# Patient Record
Sex: MALE
[De-identification: names, ages, dates, MRNs, and addresses within clinical notes are randomized per-mention and may not be internally consistent; named-entity substitution may affect disease eponyms.]

---

## 2019-02-27 PROBLEM — Z00.00 ENCOUNTER FOR PREVENTIVE HEALTH EXAMINATION: Status: ACTIVE | Noted: 2019-02-27

## 2019-03-29 ENCOUNTER — APPOINTMENT (OUTPATIENT)
Dept: OTOLARYNGOLOGY | Facility: CLINIC | Age: 64
End: 2019-03-29
Payer: MEDICAID

## 2019-03-29 PROCEDURE — V5299A: CUSTOM | Mod: NC

## 2019-04-15 ENCOUNTER — APPOINTMENT (OUTPATIENT)
Dept: OTOLARYNGOLOGY | Facility: CLINIC | Age: 64
End: 2019-04-15
Payer: MEDICAID

## 2019-04-15 VITALS
DIASTOLIC BLOOD PRESSURE: 86 MMHG | WEIGHT: 138 LBS | BODY MASS INDEX: 23.56 KG/M2 | HEIGHT: 64 IN | SYSTOLIC BLOOD PRESSURE: 132 MMHG | HEART RATE: 108 BPM

## 2019-04-15 DIAGNOSIS — Z87.39 PERSONAL HISTORY OF OTHER DISEASES OF THE MUSCULOSKELETAL SYSTEM AND CONNECTIVE TISSUE: ICD-10-CM

## 2019-04-15 DIAGNOSIS — Z86.39 PERSONAL HISTORY OF OTHER ENDOCRINE, NUTRITIONAL AND METABOLIC DISEASE: ICD-10-CM

## 2019-04-15 DIAGNOSIS — Z86.79 PERSONAL HISTORY OF OTHER DISEASES OF THE CIRCULATORY SYSTEM: ICD-10-CM

## 2019-04-15 DIAGNOSIS — Z83.3 FAMILY HISTORY OF DIABETES MELLITUS: ICD-10-CM

## 2019-04-15 DIAGNOSIS — Z82.49 FAMILY HISTORY OF ISCHEMIC HEART DISEASE AND OTHER DISEASES OF THE CIRCULATORY SYSTEM: ICD-10-CM

## 2019-04-15 DIAGNOSIS — Z87.891 PERSONAL HISTORY OF NICOTINE DEPENDENCE: ICD-10-CM

## 2019-04-15 DIAGNOSIS — Z78.9 OTHER SPECIFIED HEALTH STATUS: ICD-10-CM

## 2019-04-15 DIAGNOSIS — Z87.448 PERSONAL HISTORY OF OTHER DISEASES OF URINARY SYSTEM: ICD-10-CM

## 2019-04-15 PROCEDURE — 99213 OFFICE O/P EST LOW 20 MIN: CPT | Mod: 25

## 2019-04-15 PROCEDURE — 92504 EAR MICROSCOPY EXAMINATION: CPT

## 2019-04-15 NOTE — HISTORY OF PRESENT ILLNESS
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 This improves with the use of otic drops. However there is consistent use of a hearing aid in the right ear which aggravates the otorrhea. No ear pain reported.

## 2019-04-15 NOTE — PHYSICAL EXAM
[Normal] : mucosa is normal [Midline] : trachea located in midline position [FreeTextEntry1] : Procedure: Microscopic Ear Exam\par \par Left ear:  \par Ear canal intact without inflammation or lesion.  \par Tympanic membrane intact without inflammation.\par \par \par Right ear:  \par Mild purulence in the ear canal. A culture was taken. Ear canal debrided with suction. The tympanic membrane appears intact. Partially replaced by granulation. No visible landmarks.

## 2019-04-15 NOTE — ASSESSMENT
[FreeTextEntry1] : Active inflammation in the right ear associated with continued hearing aid use. A culture was taken today. I have continue him on otic drops have recommended a hearing aid holiday. Followup advised.

## 2019-04-19 ENCOUNTER — APPOINTMENT (OUTPATIENT)
Dept: OTOLARYNGOLOGY | Facility: CLINIC | Age: 64
End: 2019-04-19
Payer: SELF-PAY

## 2019-04-19 PROCEDURE — 92593: CPT

## 2019-04-19 PROCEDURE — V5299A: CUSTOM | Mod: NC

## 2019-04-22 LAB — BACTERIA SPEC CULT: ABNORMAL

## 2019-05-15 LAB — FUNGUS SPEC CULT ORG #8: ABNORMAL

## 2019-05-24 ENCOUNTER — APPOINTMENT (OUTPATIENT)
Dept: OTOLARYNGOLOGY | Facility: CLINIC | Age: 64
End: 2019-05-24

## 2019-07-08 ENCOUNTER — APPOINTMENT (OUTPATIENT)
Dept: OTOLARYNGOLOGY | Facility: CLINIC | Age: 64
End: 2019-07-08
Payer: MEDICARE

## 2019-07-08 VITALS
HEIGHT: 64 IN | WEIGHT: 138 LBS | HEART RATE: 100 BPM | BODY MASS INDEX: 23.56 KG/M2 | DIASTOLIC BLOOD PRESSURE: 92 MMHG | SYSTOLIC BLOOD PRESSURE: 141 MMHG

## 2019-07-08 PROCEDURE — 99213 OFFICE O/P EST LOW 20 MIN: CPT | Mod: 25

## 2019-07-08 PROCEDURE — 69210 REMOVE IMPACTED EAR WAX UNI: CPT

## 2019-07-08 NOTE — HISTORY OF PRESENT ILLNESS
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 This improves with the use of otic drops. However there is consistent use of a hearing aid in the right ear which aggravates the otorrhea. No ear pain reported. [FreeTextEntry1] : 07/08/2019 Patient report otorrhea at night, patient stated hasn't used the ofloxacin for about a month. Patient reports intermittent pain in the right ear canal. No vertigo and tinnitus reported.

## 2019-07-08 NOTE — PHYSICAL EXAM
[Normal] : mucosa is normal [Midline] : trachea located in midline position [FreeTextEntry1] : Procedure: Microscopic Ear Exam\par \par Left ear:  \par Ear canal intact without inflammation or lesion.  \par Tympanic membrane intact without inflammation.\par \par \par Right ear:  \par Obstructing cerumen debrided with a curet. Tympanic membrane is replaced by granulation, chronic, not acutely inflamed. Perforation cannot be ruled out.

## 2019-07-08 NOTE — ASSESSMENT
[FreeTextEntry1] : Recently lost right-sided hearing aid. I have recommended that he follow up with audiology for replacement.\par \par Continued use of otic drops as needed for intermittent otorrhea. Clinical monitoring advised.

## 2019-07-16 ENCOUNTER — APPOINTMENT (OUTPATIENT)
Dept: OTOLARYNGOLOGY | Facility: CLINIC | Age: 64
End: 2019-07-16

## 2019-10-07 ENCOUNTER — APPOINTMENT (OUTPATIENT)
Dept: OTOLARYNGOLOGY | Facility: CLINIC | Age: 64
End: 2019-10-07
Payer: MEDICARE

## 2019-10-07 VITALS
HEIGHT: 64 IN | SYSTOLIC BLOOD PRESSURE: 124 MMHG | HEART RATE: 102 BPM | DIASTOLIC BLOOD PRESSURE: 82 MMHG | BODY MASS INDEX: 23.56 KG/M2 | WEIGHT: 138 LBS

## 2019-10-07 PROCEDURE — 99213 OFFICE O/P EST LOW 20 MIN: CPT | Mod: 25

## 2019-10-07 PROCEDURE — 92504 EAR MICROSCOPY EXAMINATION: CPT

## 2019-10-07 NOTE — PHYSICAL EXAM
[FreeTextEntry1] : Procedure: Microscopic Ear Exam\par \par Left ear:  \par Ear canal intact without inflammation or lesion.  \par Tympanic membrane intact without inflammation.\par \par \par Right ear:  \par Ear canal intact without inflammation or lesion.\par Tympanic membrane with postsurgical changes mucosalized. No acute inflammation. [Midline] : trachea located in midline position [Normal] : mucosa is normal

## 2019-10-07 NOTE — HISTORY OF PRESENT ILLNESS
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 This improves with the use of otic drops. However there is consistent use of a hearing aid in the right ear which aggravates the otorrhea. No ear pain reported. [FreeTextEntry1] : 10/07/2019 Patient is being seen as a routine follow up for chronic infectious ear disease. Patient reports that otorrhea has subsided. Patient denies of any changes in hearing. No Pain and no vertigo reported at this visit.

## 2019-10-07 NOTE — ASSESSMENT
[FreeTextEntry1] : Apparently stable ear condition with no active inflammation. Regular use of hearing aids may precipitate recurrent external ear infections. Continued clinical monitoring recommended. Judicious use of a cups.

## 2019-10-08 ENCOUNTER — APPOINTMENT (OUTPATIENT)
Dept: OTOLARYNGOLOGY | Facility: CLINIC | Age: 64
End: 2019-10-08

## 2019-10-16 ENCOUNTER — APPOINTMENT (OUTPATIENT)
Dept: OTOLARYNGOLOGY | Facility: CLINIC | Age: 64
End: 2019-10-16
Payer: SELF-PAY

## 2019-10-16 PROCEDURE — 92593: CPT | Mod: NC

## 2019-10-23 ENCOUNTER — APPOINTMENT (OUTPATIENT)
Dept: OTOLARYNGOLOGY | Facility: CLINIC | Age: 64
End: 2019-10-23
Payer: SELF-PAY

## 2019-10-23 PROCEDURE — 92593: CPT | Mod: NC

## 2019-11-01 ENCOUNTER — APPOINTMENT (OUTPATIENT)
Dept: OTOLARYNGOLOGY | Facility: CLINIC | Age: 64
End: 2019-11-01
Payer: SELF-PAY

## 2019-11-01 DIAGNOSIS — Z46.1 ENCOUNTER FOR FITTING AND ADJUSTMENT OF HEARING AID: ICD-10-CM

## 2019-11-01 PROCEDURE — 92593: CPT | Mod: NC

## 2020-01-06 ENCOUNTER — APPOINTMENT (OUTPATIENT)
Dept: OTOLARYNGOLOGY | Facility: CLINIC | Age: 65
End: 2020-01-06

## 2020-01-13 ENCOUNTER — APPOINTMENT (OUTPATIENT)
Dept: OTOLARYNGOLOGY | Facility: CLINIC | Age: 65
End: 2020-01-13

## 2020-12-28 ENCOUNTER — APPOINTMENT (OUTPATIENT)
Dept: OTOLARYNGOLOGY | Facility: CLINIC | Age: 65
End: 2020-12-28
Payer: MEDICARE

## 2020-12-28 PROCEDURE — 92557 COMPREHENSIVE HEARING TEST: CPT

## 2020-12-28 PROCEDURE — 92567 TYMPANOMETRY: CPT

## 2020-12-28 PROCEDURE — 99214 OFFICE O/P EST MOD 30 MIN: CPT | Mod: 25

## 2020-12-28 PROCEDURE — 69210 REMOVE IMPACTED EAR WAX UNI: CPT

## 2020-12-28 PROCEDURE — 99072 ADDL SUPL MATRL&STAF TM PHE: CPT

## 2020-12-28 RX ORDER — AZELASTINE HYDROCHLORIDE 0.5 MG/ML
0.05 SOLUTION/ DROPS OPHTHALMIC
Qty: 6 | Refills: 0 | Status: DISCONTINUED | COMMUNITY
Start: 2018-09-18 | End: 2020-12-28

## 2020-12-28 RX ORDER — CHLORZOXAZONE 250 MG/1
250 TABLET ORAL
Qty: 60 | Refills: 0 | Status: DISCONTINUED | COMMUNITY
Start: 2018-10-16 | End: 2020-12-28

## 2020-12-28 RX ORDER — LISINOPRIL AND HYDROCHLOROTHIAZIDE TABLETS 10; 12.5 MG/1; MG/1
10-12.5 TABLET ORAL
Qty: 30 | Refills: 0 | Status: DISCONTINUED | COMMUNITY
Start: 2018-05-09 | End: 2020-12-28

## 2020-12-28 RX ORDER — LORATADINE 10 MG/1
10 TABLET ORAL
Qty: 30 | Refills: 0 | Status: DISCONTINUED | COMMUNITY
Start: 2018-10-10 | End: 2020-12-28

## 2020-12-28 RX ORDER — FLUTICASONE PROPIONATE 50 UG/1
50 SPRAY, METERED NASAL
Qty: 16 | Refills: 0 | Status: DISCONTINUED | COMMUNITY
Start: 2018-04-25 | End: 2020-12-28

## 2020-12-28 RX ORDER — LIDOCAINE 5% 700 MG/1
5 PATCH TOPICAL
Qty: 60 | Refills: 0 | Status: DISCONTINUED | COMMUNITY
Start: 2018-10-16 | End: 2020-12-28

## 2020-12-28 RX ORDER — BUPROPION HYDROCHLORIDE 75 MG/1
75 TABLET, FILM COATED ORAL
Qty: 120 | Refills: 0 | Status: DISCONTINUED | COMMUNITY
Start: 2018-08-08 | End: 2020-12-28

## 2020-12-28 RX ORDER — OFLOXACIN OTIC 3 MG/ML
0.3 SOLUTION AURICULAR (OTIC)
Qty: 1 | Refills: 2 | Status: DISCONTINUED | COMMUNITY
Start: 2018-10-15 | End: 2020-12-28

## 2020-12-28 RX ORDER — DULAGLUTIDE 1.5 MG/.5ML
1.5 INJECTION, SOLUTION SUBCUTANEOUS
Qty: 4 | Refills: 0 | Status: DISCONTINUED | COMMUNITY
Start: 2018-06-06 | End: 2020-12-28

## 2020-12-28 RX ORDER — OFLOXACIN OTIC 3 MG/ML
0.3 SOLUTION AURICULAR (OTIC)
Qty: 1 | Refills: 1 | Status: ACTIVE | COMMUNITY
Start: 2020-12-28 | End: 1900-01-01

## 2020-12-28 RX ORDER — GEMFIBROZIL 600 MG/1
600 TABLET, FILM COATED ORAL
Qty: 60 | Refills: 0 | Status: DISCONTINUED | COMMUNITY
Start: 2018-05-09 | End: 2020-12-28

## 2020-12-28 RX ORDER — METFORMIN HYDROCHLORIDE 500 MG/1
500 TABLET, COATED ORAL
Qty: 60 | Refills: 0 | Status: DISCONTINUED | COMMUNITY
Start: 2018-10-10 | End: 2020-12-28

## 2020-12-28 NOTE — HISTORY OF PRESENT ILLNESS
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 Patient reports intermittent right otorrhea. Patient denies of any changes in hearing. No Pain and no vertigo reported at this visit.  Not using otic drops.

## 2020-12-28 NOTE — DATA REVIEWED
[de-identified] : Complete audiometry was ordered and completed today. This was separately reported by the audiologist. The results were reviewed in detail with the patient.\par \par

## 2020-12-28 NOTE — PHYSICAL EXAM
[Normal] : temporomandibular joint is normal [FreeTextEntry1] : Procedure: Microscopic Ear Exam with cerumen debridement\par \par Left ear:  \par obstructing cerumen debrided with alligator forceps. Ear canal intact without inflammation or lesion.  \par Tympanic membrane intact without inflammation.\par \par \par Right ear:  \par Ear canal intact without inflammation or lesion.\par Tympanic membrane with postsurgical changes mucosalized. No acute inflammation.

## 2020-12-28 NOTE — CONSULT LETTER
[Please see my note below.] : Please see my note below. [FreeTextEntry2] : Dear ABILIO SORENSEN  [FreeTextEntry1] : Thank you for allowing me to participate in the care of SUBHASH TUTTLE .\par Please see the attached visit note.\par \par \par \par Lorne Miller\par Otology\par Medical Director of [facility]\par Department of Otolaryngology\par [facility]

## 2020-12-28 NOTE — REASON FOR VISIT
[Subsequent Evaluation] : a subsequent evaluation for [Ear Drainage] : ear drainage [Hearing Loss] : hearing loss

## 2020-12-28 NOTE — ASSESSMENT
[FreeTextEntry1] : No current evidence of inflammation in the right ear with a history of intermittent otorrhea. We discussed the occasional use of otic drops for otorrhea. Clinical monitoring advised. Continued use of bilateral application.

## 2021-06-28 ENCOUNTER — APPOINTMENT (OUTPATIENT)
Dept: OTOLARYNGOLOGY | Facility: CLINIC | Age: 66
End: 2021-06-28
Payer: MEDICARE

## 2021-06-28 VITALS — HEIGHT: 64 IN | WEIGHT: 138 LBS | BODY MASS INDEX: 23.56 KG/M2 | TEMPERATURE: 97.3 F

## 2021-06-28 PROCEDURE — 99213 OFFICE O/P EST LOW 20 MIN: CPT | Mod: 25

## 2021-06-28 PROCEDURE — 31231 NASAL ENDOSCOPY DX: CPT

## 2021-06-28 PROCEDURE — 69210 REMOVE IMPACTED EAR WAX UNI: CPT

## 2021-06-28 RX ORDER — MOMETASONE 50 UG/1
50 SPRAY, METERED NASAL DAILY
Qty: 1 | Refills: 2 | Status: ACTIVE | COMMUNITY
Start: 2021-06-28 | End: 1900-01-01

## 2021-06-28 NOTE — CONSULT LETTER
[Please see my note below.] : Please see my note below. [FreeTextEntry2] : Dear ABILIO SORENSEN  [FreeTextEntry1] : Thank you for allowing me to participate in the care of SUBHASH TUTTLE .\par Please see the attached visit note.\par \par \par \par Lorne Miller\par Otology\par Medical Director of [facility]\par Department of Otolaryngology\par [facility]

## 2021-06-28 NOTE — PROCEDURE
[FreeTextEntry6] : PROCEDURE:  NASAL ENDOSCOPY  \par \par Surgeon: Dr. Miller\par Indication: Chronic Rhinitis\par Anesthetic: Topical lidocaine and Afrin\par Procedure: The patient was placed in a sitting position.  Following application of the topical anesthetic and decongestant, exam was performed with a flexible endoscope.  The following anatomic sites were directly examined bilaterally in a sequential fashion:\par The scope was introduced in the nasal passage between the middle and inferior turbinates to exam the inferior portion of the middle meatus and the fontanelle, as well as the maxillary ostia. Next, the scope was passed medially and posteriorly to the middle turbinates to examine the sphenoethmoid recess and the superior turbinate region.\par \par Nasopharynx: no masses, choanae patent, no adenoid tissue\par \par The following findings were noted:\par \par bilateral mild to moderate turbinate hypertrophy with airway obstruction. No purulence or polypoid disease.

## 2021-06-28 NOTE — HISTORY OF PRESENT ILLNESS
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  Hearing well with hearing aids.  No pain.  Also reports nasal obstruction with facial pressure. Past history of allergy.

## 2021-06-28 NOTE — PHYSICAL EXAM
[FreeTextEntry1] : Microscopic ear exam with cerumen debridement:\par \par Right ear: Obstructing cerumen was debrided from the ear canal using suction, and curet.  The ear canal was within normal limits.  The tympanic membrane was intact and noninflamed. Postsurgical changes. Intact without inflammation\par \par Left ear: The ear canal was patent and nonobstructed.  The tympanic membrane was intact and noninflamed. [Nasal Endoscopy Performed] : nasal endoscopy was performed, see procedure section for findings [Normal] : external appearance is normal [de-identified] : enlarged

## 2021-06-28 NOTE — ASSESSMENT
[FreeTextEntry1] : Stable right-sided ear condition, without evidence of inflammation at this time.\par \par Chronic rhinitis with nasal symptoms. Consistent use of sterile nasal spray recommended.\par \par Continued clinical monitoring with bilateral amplification. Advised.

## 2021-12-22 ENCOUNTER — APPOINTMENT (OUTPATIENT)
Dept: OTOLARYNGOLOGY | Facility: CLINIC | Age: 66
End: 2021-12-22
Payer: MEDICARE

## 2021-12-22 VITALS — TEMPERATURE: 98.2 F | BODY MASS INDEX: 22.2 KG/M2 | WEIGHT: 130 LBS | HEIGHT: 64 IN

## 2021-12-22 PROCEDURE — 92550 TYMPANOMETRY & REFLEX THRESH: CPT

## 2021-12-22 PROCEDURE — 92557 COMPREHENSIVE HEARING TEST: CPT

## 2021-12-22 PROCEDURE — 99213 OFFICE O/P EST LOW 20 MIN: CPT | Mod: 25

## 2021-12-22 RX ORDER — OFLOXACIN OTIC 3 MG/ML
0.3 SOLUTION AURICULAR (OTIC)
Qty: 1 | Refills: 3 | Status: ACTIVE | COMMUNITY
Start: 2019-04-15 | End: 1900-01-01

## 2021-12-22 NOTE — DATA REVIEWED
[de-identified] : In order to investigate current symptoms, Complete audiometry was ordered and completed today. I have interpreted these results and reviewed them in detail with the patient.\par \par

## 2021-12-22 NOTE — HISTORY OF PRESENT ILLNESS
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  Also bothered by watery positional rhinorrhea. No systemic symptoms

## 2021-12-22 NOTE — ASSESSMENT
[FreeTextEntry1] : No active inflammation today; otorrhea appears to be intermittent.\par Ear hygiene, reviewed in detail.\par \par Continue with hearing aids bilaterally.  Otic drops for otorrhea as needed.  Nasal spray for complaint of rhinorrhea.  This was discussed with the patient in detail\par \par Follow up plans discussed.

## 2022-03-15 ENCOUNTER — RX RENEWAL (OUTPATIENT)
Age: 67
End: 2022-03-15

## 2022-04-20 ENCOUNTER — APPOINTMENT (OUTPATIENT)
Dept: OTOLARYNGOLOGY | Facility: CLINIC | Age: 67
End: 2022-04-20
Payer: MEDICARE

## 2022-04-20 VITALS — TEMPERATURE: 97 F | WEIGHT: 130 LBS | BODY MASS INDEX: 22.2 KG/M2 | HEIGHT: 64 IN

## 2022-04-20 DIAGNOSIS — H66.3X1 OTHER CHRONIC SUPPURATIVE OTITIS MEDIA, RIGHT EAR: ICD-10-CM

## 2022-04-20 PROCEDURE — 92557 COMPREHENSIVE HEARING TEST: CPT

## 2022-04-20 PROCEDURE — 92567 TYMPANOMETRY: CPT

## 2022-04-20 PROCEDURE — 31231 NASAL ENDOSCOPY DX: CPT

## 2022-04-20 PROCEDURE — 99213 OFFICE O/P EST LOW 20 MIN: CPT | Mod: 25

## 2022-04-25 NOTE — HISTORY OF PRESENT ILLNESS
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  No otorrhea.  Hearing may be worse.  Using bilateral amplification\par Complains of nasal congestion, rhinorrhea and crusting in the nose.

## 2022-04-25 NOTE — ASSESSMENT
[FreeTextEntry1] : Hearing loss affecting the right ear greater than the left associated with chronic infectious ear disease.  This is mostly stable at this time.  I have reviewed ear hygiene and have recommended follow-up with his audiologist for hearing aid optimization.\par \par Nasal hygiene reviewed.  Continued use of saline nasal spray and Flonase

## 2022-04-25 NOTE — PROCEDURE
[FreeTextEntry6] : PROCEDURE:  NASAL ENDOSCOPY  \par \par Surgeon: Dr. Miller\par Indication: Chronic Rhinitis\par Anesthetic: Topical lidocaine and Afrin\par Procedure: The patient was placed in a sitting position.  Following application of the topical anesthetic and decongestant, exam was performed with a flexible endoscope.  The following anatomic sites were directly examined bilaterally in a sequential fashion:\par The scope was introduced in the nasal passage between the middle and inferior turbinates to exam the inferior portion of the middle meatus and the fontanelle, as well as the maxillary ostia. Next, the scope was passed medially and posteriorly to the middle turbinates to examine the sphenoethmoid recess and the superior turbinate region.\par \par Nasopharynx: no masses, choanae patent, no adenoid tissue\par \par The following findings were noted:\par Patent nasal airway bilaterally.  No purulence.  A small polypoid lesion is noted in the left ostiomeatal complex region.  This is nonobstructing.

## 2022-04-25 NOTE — PHYSICAL EXAM
[Nasal Endoscopy Performed] : nasal endoscopy was performed, see procedure section for findings [Normal] : no abnormal secretions [FreeTextEntry1] : Microscopic ear exam with cerumen debridement:\par \par Right ear: Obstructing cerumen was debrided from the ear canal using suction, and curet.  The ear canal was otherwise within normal limits.  Subtotal tympanic membrane perforation.  Retracted margins.  No active inflammation.\par \par \par Left ear: Obstructing cerumen was debrided from the ear canal using suction, and curets.  The ear canal was otherwise within normal limits.  The tympanic membrane was intact and noninflamed.

## 2022-10-19 ENCOUNTER — APPOINTMENT (OUTPATIENT)
Dept: OTOLARYNGOLOGY | Facility: CLINIC | Age: 67
End: 2022-10-19

## 2022-10-19 VITALS — BODY MASS INDEX: 22.2 KG/M2 | WEIGHT: 130 LBS | TEMPERATURE: 97.3 F | HEIGHT: 64 IN

## 2022-10-19 DIAGNOSIS — H90.A31 MIXED CONDUCTIVE AND SENSORINEURAL HEARING LOSS, UNILATERAL, RIGHT EAR WITH RESTRICTED HEARING ON THE  CONTRALATERAL SIDE: ICD-10-CM

## 2022-10-19 DIAGNOSIS — H90.A21 SENSORINEURAL HEARING LOSS, UNILATERAL, RIGHT EAR, WITH RESTRICTED HEARING ON THE CONTRALATERAL SIDE: ICD-10-CM

## 2022-10-19 PROCEDURE — 92567 TYMPANOMETRY: CPT

## 2022-10-19 PROCEDURE — 92557 COMPREHENSIVE HEARING TEST: CPT

## 2022-10-19 PROCEDURE — 99213 OFFICE O/P EST LOW 20 MIN: CPT | Mod: 25

## 2022-10-19 NOTE — ASSESSMENT
[FreeTextEntry1] : Ear hygiene reviewed in detail.  Follow up recommended if symptoms persist or progresses.  Routine follow up for cerumen management suggested.\par \par Improved inflammatory condition of the ear canals.  I have recommended semiannual follow-up for cerumen management.\par \par Follow-up with audiologist for hearing technology adjustments as needed.

## 2022-10-19 NOTE — CONSULT LETTER
[Please see my note below.] : Please see my note below. [FreeTextEntry2] : Dear ABILIO SORENSEN  [FreeTextEntry1] : Thank you for allowing me to participate in the care of SUBHASH TUTTLE .\par Please see the attached visit note.\par \par \par \par Lorne Miller\par Otology\par Medical Director of [facility]\par Department of Otolaryngology\par [facility]

## 2022-10-19 NOTE — PHYSICAL EXAM
[FreeTextEntry1] : Microscopic ear exam with cerumen debridement:\par \par Right ear: Obstructing cerumen was debrided from the ear canal using suction, and curet.  The ear canal was otherwise within normal limits.  Postsurgical change.  Tympanic membrane appears intact with areas of thin membrane.  Replaced by cartilage.  No active inflammation.\par \par \par Left ear: Obstructing cerumen was debrided from the ear canal using suction, and curets.  The ear canal was otherwise within normal limits.  The tympanic membrane was intact and noninflamed. [Normal] : mucosa is normal [Midline] : trachea located in midline position

## 2022-10-19 NOTE — DATA REVIEWED
[de-identified] : In light of the patients current symptoms, Complete audiometry was ordered and completed today. I have interpreted these results and reviewed them in detail with the patient.\par \par Bilateral sensorineural hearing loss moderate degree affecting the right ear greater than the left

## 2022-10-19 NOTE — HISTORY OF PRESENT ILLNESS
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 Mild otorrhea possible. No change in hearing.

## 2023-05-01 ENCOUNTER — APPOINTMENT (OUTPATIENT)
Dept: OTOLARYNGOLOGY | Facility: CLINIC | Age: 68
End: 2023-05-01
Payer: MEDICARE

## 2023-05-01 VITALS — HEIGHT: 64 IN | WEIGHT: 130 LBS | BODY MASS INDEX: 22.2 KG/M2

## 2023-05-01 DIAGNOSIS — H93.299 OTHER ABNORMAL AUDITORY PERCEPTIONS, UNSPECIFIED EAR: ICD-10-CM

## 2023-05-01 PROCEDURE — 92550 TYMPANOMETRY & REFLEX THRESH: CPT | Mod: 52

## 2023-05-01 PROCEDURE — 99213 OFFICE O/P EST LOW 20 MIN: CPT | Mod: 25

## 2023-05-01 PROCEDURE — 92557 COMPREHENSIVE HEARING TEST: CPT

## 2023-05-01 RX ORDER — AZELASTINE HYDROCHLORIDE 137 UG/1
0.1 SPRAY, METERED NASAL
Qty: 1 | Refills: 2 | Status: ACTIVE | COMMUNITY
Start: 2021-12-22 | End: 1900-01-01

## 2023-05-01 NOTE — PHYSICAL EXAM
[FreeTextEntry1] : Microscopic ear exam with cerumen debridement:\par \par Right ear: Obstructing cerumen was debrided from the ear canal using suction, and curet.  The ear canal was otherwise within normal limits.  Postsurgical change.  Tympanic membrane intact with areas replaced by cartilage.  No active inflammation.\par \par \par Left ear: Obstructing cerumen was debrided from the ear canal using suction, and curets.  The ear canal was otherwise within normal limits.  The tympanic membrane was intact and noninflamed. [Normal] : mucosa is normal [Midline] : trachea located in midline position

## 2023-05-01 NOTE — CONSULT LETTER
[FreeTextEntry2] : Dear ABILIO SORENSEN  [FreeTextEntry1] : Thank you for allowing me to participate in the care of SUBHASH TUTTLE .\par Please see the attached visit note.\par \par \par \par Lorne Miller\par Otology\par Medical Director of [facility]\par Department of Otolaryngology\par [facility]

## 2023-05-01 NOTE — ASSESSMENT
[FreeTextEntry1] : No active inflammation in the ears identified.  Ear hygiene reviewed.\par \par Nasal spray provided for active allergy symptoms.\par Continued use of bilateral amplification.\par Continue clinical monitoring recommended.

## 2023-05-01 NOTE — DATA REVIEWED
[de-identified] : In light of the patients current symptoms, Complete audiometry was ordered and completed today. 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

## 2023-05-01 NOTE — HISTORY OF PRESENT ILLNESS
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  Occasional mild right otorrhea. No pain. \par using bilateral amplification with benefit.

## 2023-08-14 ENCOUNTER — RX RENEWAL (OUTPATIENT)
Age: 68
End: 2023-08-14

## 2023-10-12 NOTE — REASON FOR VISIT
Price (Do Not Change): 0.00 Instructions: This plan will send the code FBSE to the PM system.  DO NOT or CHANGE the price. Detail Level: Simple [Subsequent Evaluation] : a subsequent evaluation for [Hearing Loss] : hearing loss

## 2023-11-09 ENCOUNTER — APPOINTMENT (OUTPATIENT)
Dept: OTOLARYNGOLOGY | Facility: CLINIC | Age: 68
End: 2023-11-09
Payer: MEDICARE

## 2023-11-09 DIAGNOSIS — H90.6 MIXED CONDUCTIVE AND SENSORINEURAL HEARING LOSS, BILATERAL: ICD-10-CM

## 2023-11-09 PROCEDURE — 99213 OFFICE O/P EST LOW 20 MIN: CPT | Mod: 25

## 2023-11-09 PROCEDURE — 69210 REMOVE IMPACTED EAR WAX UNI: CPT

## 2023-11-09 RX ORDER — AZELASTINE HYDROCHLORIDE 137 UG/1
137 SPRAY, METERED NASAL
Qty: 1 | Refills: 5 | Status: ACTIVE | COMMUNITY
Start: 2023-08-14 | End: 1900-01-01

## 2023-11-13 PROBLEM — H90.6 MIXED HEARING LOSS, BILATERAL: Status: ACTIVE | Noted: 2019-07-08

## 2024-02-08 ENCOUNTER — APPOINTMENT (OUTPATIENT)
Dept: OTOLARYNGOLOGY | Facility: CLINIC | Age: 69
End: 2024-02-08
Payer: MEDICARE

## 2024-02-08 PROCEDURE — 69210 REMOVE IMPACTED EAR WAX UNI: CPT

## 2024-02-08 PROCEDURE — 92557 COMPREHENSIVE HEARING TEST: CPT

## 2024-02-08 PROCEDURE — 92567 TYMPANOMETRY: CPT

## 2024-02-08 PROCEDURE — 99213 OFFICE O/P EST LOW 20 MIN: CPT | Mod: 25

## 2024-02-08 NOTE — ASSESSMENT
[FreeTextEntry1] : SUBHASH TUTTLE  has positional dizziness. 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

## 2024-02-08 NOTE — PHYSICAL EXAM
[TextEntry] : PHYSICAL EXAM  General: The patient was alert and oriented and in no distress. Voice was normal.  Neurologic: Cranial Nerves II-XII intact PERRLA There was no significant nystagmus or disconjugate gaze noted.  EOM's: Normal  Face: The patient had no facial asymmetry or mass. The skin was unremarkable.  Ears: External ears were normal without deformity. Ear canals were normal. Tympanic membranes were intact and normal. No perforation or effusion I removed impacted cerumen from both ears under the microscope with curet, forceps and suction  Nose:  The external nose had no significant deformity.  There was no facial tenderness.  On anterior rhinoscopy, the nasal mucosa was normal.  The anterior septum was normal. There were no visualized polyps purulence  or masses.  Oral cavity: The oral mucosa was normal. The oral and base of tongue were clear and without mass. The gingival and buccal mucosa were moist and without lesions. The palate moved well. There was no cleft palate. There appeared to be good salivary flow.   There was no pus, erythema or mass in the oral cavity/oropharynx.  Neck:  The neck was symmetrical. The parotid and submandibular glands were normal without masses. The trachea was midline and there was no unusual crepitus. Thyroid was smooth and nontender and no masses were palpated. Cervical adenopathy- none.  Positional Vestibular Testing I performed the Twain Hallpike test in the usual manner. It revealed no evidence of posterior canal lithiasis.   I performed the Supine Roll Test in the usual manner. It revealed no evidence of lateral canal lithiasis.

## 2024-02-08 NOTE — HISTORY OF PRESENT ILLNESS
[de-identified] : SUBHASH TUTTLE  is a 68 year man with a history of severe hearing loss using hearing aids. He has been having exacerbation of positional dizziness and is using a cane for balance.

## 2024-05-09 ENCOUNTER — APPOINTMENT (OUTPATIENT)
Dept: OTOLARYNGOLOGY | Facility: CLINIC | Age: 69
End: 2024-05-09

## 2024-05-09 DIAGNOSIS — J30.0 VASOMOTOR RHINITIS: ICD-10-CM

## 2024-05-09 DIAGNOSIS — H90.3 SENSORINEURAL HEARING LOSS, BILATERAL: ICD-10-CM

## 2024-05-09 DIAGNOSIS — H61.20 IMPACTED CERUMEN, UNSPECIFIED EAR: ICD-10-CM

## 2024-05-09 DIAGNOSIS — R42 DIZZINESS AND GIDDINESS: ICD-10-CM

## 2024-05-09 PROCEDURE — 69210 REMOVE IMPACTED EAR WAX UNI: CPT

## 2024-05-09 PROCEDURE — 99213 OFFICE O/P EST LOW 20 MIN: CPT | Mod: 25

## 2024-05-09 NOTE — HISTORY OF PRESENT ILLNESS
[de-identified] : SUBHASH TUTTLE  is a 68 year man with a history of  presbycusis who uses hearing aids. He has been having dizziness/imbalance which is better. I had suggested VT. He is seeing Dr. MALACHI Fermin neurology who is working this up.

## 2024-05-09 NOTE — REASON FOR VISIT
[Subsequent Evaluation] : a subsequent evaluation for [Hearing Loss] : hearing loss [Vertigo] : vertigo [FreeTextEntry2] : ENT

## 2024-05-09 NOTE — PHYSICAL EXAM
[TextEntry] : PHYSICAL EXAM  General: The patient was alert and oriented and in no distress. Voice was normal.    EOM's: Normal  Face: The patient had no facial asymmetry or mass. The skin was unremarkable.  Ears: External ears were normal without deformity. Ear canals were normal. Tympanic membranes were intact and normal. No perforation or effusion I removed impacted cerumen from both ears under the microscope with curet, forceps and suction  Nose:  The external nose had no significant deformity.  There was no facial tenderness.  On anterior rhinoscopy, the nasal mucosa was normal.  The anterior septum was normal. There were no visualized polyps purulence  or masses.  Oral cavity: The oral mucosa was normal. The oral and base of tongue were clear and without mass. The gingival and buccal mucosa were moist and without lesions. The palate moved well. There was no cleft palate. There appeared to be good salivary flow.   There was no pus, erythema or mass in the oral cavity/oropharynx.  Neck:  The neck was symmetrical. The parotid and submandibular glands were normal without masses. The trachea was midline and there was no unusual crepitus. Thyroid was smooth and nontender and no masses were palpated. Cervical adenopathy- none.

## 2024-05-09 NOTE — REVIEW OF SYSTEMS
[Hearing Loss] : hearing loss [Dizziness] : dizziness [Patient Intake Form Reviewed] : Patient intake form was reviewed

## 2024-11-07 ENCOUNTER — APPOINTMENT (OUTPATIENT)
Dept: OTOLARYNGOLOGY | Facility: CLINIC | Age: 69
End: 2024-11-07